# Patient Record
Sex: FEMALE | Race: WHITE | NOT HISPANIC OR LATINO | URBAN - METROPOLITAN AREA
[De-identification: names, ages, dates, MRNs, and addresses within clinical notes are randomized per-mention and may not be internally consistent; named-entity substitution may affect disease eponyms.]

---

## 2017-05-05 ENCOUNTER — LAB REQUISITION (OUTPATIENT)
Dept: LAB | Facility: HOSPITAL | Age: 82
End: 2017-05-05
Payer: MEDICARE

## 2017-05-05 DIAGNOSIS — R32 URINARY INCONTINENCE: ICD-10-CM

## 2017-05-05 DIAGNOSIS — N39.0 URINARY TRACT INFECTION: ICD-10-CM

## 2017-05-05 PROCEDURE — 87086 URINE CULTURE/COLONY COUNT: CPT | Performed by: FAMILY MEDICINE

## 2017-05-05 PROCEDURE — 87077 CULTURE AEROBIC IDENTIFY: CPT | Performed by: FAMILY MEDICINE

## 2017-05-05 PROCEDURE — 87186 SC STD MICRODIL/AGAR DIL: CPT | Performed by: FAMILY MEDICINE

## 2017-05-07 LAB — BACTERIA UR CULT: NORMAL

## 2017-05-08 ENCOUNTER — GENERIC CONVERSION - ENCOUNTER (OUTPATIENT)
Dept: OTHER | Facility: OTHER | Age: 82
End: 2017-05-08

## 2017-08-17 ENCOUNTER — GENERIC CONVERSION - ENCOUNTER (OUTPATIENT)
Dept: OTHER | Facility: OTHER | Age: 82
End: 2017-08-17

## 2017-08-17 ENCOUNTER — LAB REQUISITION (OUTPATIENT)
Dept: LAB | Facility: HOSPITAL | Age: 82
End: 2017-08-17
Payer: MEDICARE

## 2017-08-17 DIAGNOSIS — R39.9 UNSPECIFIED SYMPTOMS AND SIGNS INVOLVING THE GENITOURINARY SYSTEM: ICD-10-CM

## 2017-08-17 LAB
BILIRUB UR QL STRIP: NEGATIVE
CLARITY UR: NORMAL
COLOR UR: NORMAL
GLUCOSE (HISTORICAL): NORMAL
HGB UR QL STRIP.AUTO: 50
KETONES UR STRIP-MCNC: NEGATIVE MG/DL
LEUKOCYTE ESTERASE UR QL STRIP: 500
NITRITE UR QL STRIP: NORMAL
PH UR STRIP.AUTO: 7 [PH]
PROT UR STRIP-MCNC: NORMAL MG/DL
SP GR UR STRIP.AUTO: 1.01
UROBILINOGEN UR QL STRIP.AUTO: NORMAL

## 2017-08-17 PROCEDURE — 87077 CULTURE AEROBIC IDENTIFY: CPT | Performed by: FAMILY MEDICINE

## 2017-08-17 PROCEDURE — 87086 URINE CULTURE/COLONY COUNT: CPT | Performed by: FAMILY MEDICINE

## 2017-08-17 PROCEDURE — 87186 SC STD MICRODIL/AGAR DIL: CPT | Performed by: FAMILY MEDICINE

## 2017-08-19 LAB — BACTERIA UR CULT: NORMAL

## 2017-08-21 ENCOUNTER — GENERIC CONVERSION - ENCOUNTER (OUTPATIENT)
Dept: OTHER | Facility: OTHER | Age: 82
End: 2017-08-21

## 2017-10-27 ENCOUNTER — GENERIC CONVERSION - ENCOUNTER (OUTPATIENT)
Dept: OTHER | Facility: OTHER | Age: 82
End: 2017-10-27

## 2018-01-09 NOTE — RESULT NOTES
Verified Results  (1) URINE CULTURE [id number] 12:00PM Kiara Jacobson     Test Name Result Flag Reference   CLINICAL REPORT (Report)     Test:        Urine culture  Specimen Type:   Urine  Specimen Date:   8/17/2017 12:00 PM  Result Date:    8/19/2017 2:24 PM  Result Status:   Final result  Resulting Lab:   [facility] [address] [address]            Tel: [phone]      CULTURE                                       ------------------                                   >100,000 cfu/ml Raoultella ornithinolytica     *** This organism has been edited   The previous result was Gram Negative Dg      Enteric Like on 8/18/2017 at 1229 EDT  ***      SUSCEPTIBILITY                                   ------------------                                                       Raoultella ornithinolytica  METHOD                 MARY ANN  -------------------------------------  --------------------------  AMOXICILLIN + CLAVULANATE        <=8/4 ug/ml  Susceptible  AMPICILLIN ($$)             >16 00 ug/ml Resistant  AMPICILLIN + SULBACTAM ($)       <=8/4 ug/ml  Susceptible  AZTREONAM ($$$)             <=8 ug/ml   Susceptible  CEFAZOLIN ($)              <=8 00 ug/ml Susceptible  CIPROFLOXACIN ($)            <=1 00 ug/ml Susceptible  ERTAPENEM ($$$)             <=2 0 ug/ml  Susceptible  GENTAMICIN ($$)             <=4 ug/ml   Susceptible  IMIPENEM                <=4 ug/ml   Susceptible  LEVOFLOXACIN ($)            <=2 00 ug/ml Susceptible  MEROPENEM ($$)             <=4 00 ug/ml Susceptible  NITROFURANTOIN             64 ug/ml   Intermediate  PIPERACILLIN + TAZOBACTAM ($$$)     <=16 ug/ml  Susceptible  TETRACYCLINE              <=4 ug/ml   Susceptible  TOBRAMYCIN ($)             <=4 ug/ml   Susceptible  TRIMETHOPRIM + SULFAMETHOXAZOLE ($$$)  <=2/38 ug/ml Susceptible       Plan  Acute UTI    · Sulfamethoxazole-Trimethoprim 800-160 MG Oral Tablet; TAKE 1 TABLET  TWICE DAILY

## 2018-01-10 NOTE — RESULT NOTES
Verified Results  (1) URINE CULTURE [id number] 12:00PM Paras Olivera     Test Name Result Flag Reference   CLINICAL REPORT (Report)     Test:        Urine culture  Specimen Type:   Urine  Specimen Date:   5/5/2017 12:00 PM  Result Date:    5/7/2017 11:15 AM  Result Status:   Final result  Resulting Lab:   [facility] [address] [address]            Tel: [phone]      CULTURE                                       ------------------                                   >100,000 cfu/ml Escherichia coli      SUSCEPTIBILITY                                   ------------------                                                       Escherichia coli  METHOD                 MARY ANN  -------------------------------------  -------------------------  AMPICILLIN ($$)             <=8 00 ug/ml Susceptible  AZTREONAM ($$$)             <=8 ug/ml   Susceptible  CEFAZOLIN ($)              <=8 00 ug/ml Susceptible  CIPROFLOXACIN ($)            <=1 00 ug/ml Susceptible  GENTAMICIN ($$)             <=4 ug/ml   Susceptible  LEVOFLOXACIN ($)            <=2 00 ug/ml Susceptible  NITROFURANTOIN             <=32 ug/ml  Susceptible  PIPERACILLIN + TAZOBACTAM ($$$)     <=16 ug/ml  Susceptible  TETRACYCLINE              <=4 ug/ml   Susceptible  TOBRAMYCIN ($)             <=4 ug/ml   Susceptible  TRIMETHOPRIM + SULFAMETHOXAZOLE ($$$)  <=2/38 ug/ml Susceptible

## 2018-01-11 NOTE — PROGRESS NOTES
Assessment    1  Tinea corporis (110 5) (B35 4)   2  HTN (hypertension) (401 9) (I10)   3  Atherosclerosis of coronary artery of native heart without angina pectoris, unspecified   vessel or lesion type (414 01) (I25 10)   4  Recurrent UTI (urinary tract infection) (599 0) (N39 0)   5  Encounter for preventive health examination (V70 0) (Z00 00)    Plan  Recurrent UTI (urinary tract infection)    · (1) URINE CULTURE; Source:Urine, Clean Catch; Status:Active - Retrospective By  Protocol Authorization; Requested HPT:[id number];    · Urine Dip Automated- POC; Status:Resulted - Requires Verification;   Done: 95PMB9809  04:19PM  Tinea corporis    · Clotrimazole-Betamethasone 1-0 05 % External Cream; APPLY SPARINGLY TO  THE AFFECTED AREA(S) TWICE DAILY    Discussion/Summary    CONTINUE CURRENT TREATMENT PLAN  CREAM AS DIRECTED  URINE CULTURE  The treatment plan was reviewed with the patient/guardian  The patient/guardian understands and agrees with the treatment plan      Chief Complaint  Patient is here today for a dry patchy rash that is all over her body  Her right eye is also red with crusting in the mornings upon awakening  lb/lpn      History of Present Illness  HPI: AS NOTED ABOVE      Review of Systems    Constitutional: feeling tired, but no fever, not feeling poorly and no chills  ENT: no earache, no nosebleeds, no sore throat, no nasal discharge and no hoarseness  Cardiovascular: lower extremity edema, but no chest pain, no intermittent leg claudication, the heart rate was not fast and no palpitations  Respiratory: shortness of breath during exertion, but no shortness of breath, no cough, no orthopnea, no wheezing and no PND  Gastrointestinal: constipation, but no abdominal pain, no nausea, no vomiting, no diarrhea and no blood in stools  Musculoskeletal: arthralgias and joint stiffness, but no joint swelling and no myalgias  Integumentary: rash     Neurological: no headache, no numbness, no tingling and no dizziness  ROS reviewed  Active Problems    1  Acute bacterial conjunctivitis of both eyes (372 03) (H10 023)   2  Acute coronary syndrome (411 1) (I24 9)   3  Acute UTI (599 0) (N39 0)   4  Anemia (285 9) (D64 9)   5  Atherosclerosis of both carotid arteries (433 10,433 30) (I65 23)   6  Atherosclerosis of coronary artery of native heart without angina pectoris, unspecified   vessel or lesion type (414 01) (I25 10)   7  Carotid atherosclerosis (433 10) (I65 29)   8  Edema, unspecified edema (782 3) (R60 9)   9  Gastroesophageal reflux (530 81) (K21 9)   10  HTN (hypertension) (401 9) (I10)   11  Hyperlipidemia (272 4) (E78 5)   12  Mitral valve disorder (424 0) (I05 9)   13  Nocturia (788 43) (R35 1)   14  Occlusion and stenosis of carotid artery with cerebral infarction (433 11) (I63 9)   15  Osteoarthritis (715 90) (M19 90)   16  Syncope and collapse (780 2) (R55)   17  Tachycardia (785 0) (R00 0)   18  Thrush (112 0) (B37 0)   19  Urinary incontinence (788 30) (R32)    Past Medical History    1  History of Anemia due to poor nutrition (281 9) (D53 9)   2  History of Appetite loss (783 0) (R63 0)   3  History of Dehydration, mild (276 51) (E86 0)   4  History of acute cystitis (V13 02) (Z87 440)   5  History of fatigue (V13 89) (Z87 898)   6  History of urinary tract infection (V13 02) (Z87 440)   7  History of vertigo (V12 49) (Z87 898)   8  History of Polyuria (788 42) (R35 8)   9  History of Skin rash (782 1) (R21)   10  History of Tinea corporis (110 5) (B35 4)  Active Problems And Past Medical History Reviewed: The active problems and past medical history were reviewed and updated today  Family History    1  Family history of    2  Family history of congestive heart failure (V17 49) (Z82 49)    3  Family history of    4  Family history of myocardial infarction (V17 3) (Z82 49)    5  Family history of    6   Family history of lung cancer (V16 1) (Z80 1)  Family History Reviewed: The family history was reviewed and updated today  Social History    · Never a smoker   · No alcohol use   · No caffeine use  The social history was reviewed and updated today  Surgical History    1  History of Breast Surgery Lumpectomy   2  History of Knee Replacement  Surgical History Reviewed: The surgical history was reviewed and updated today  Current Meds   1  AmLODIPine Besylate 5 MG Oral Tablet; TAKE ONE (1) TABLET(S) DAILY  Requested   for: 93OMT7901; Last Rx:16Jun2015 Ordered   2  Cefuroxime Axetil 250 MG Oral Tablet; Take one twice daily for 10 days; Therapy: 21Jan2016 to (Evaluate:31Jan2016)  Requested for: 21Jan2016; Last   Rx:21Jan2016 Ordered   3  Clotrimazole-Betamethasone 1-0 05 % External Cream; APPLY THIN FILM TO   AFFECTED AREA(S) 3 TIMES DAILY; Therapy: (Recorded:27Jan2016) to Recorded   4  Diclofenac Sodium 75 MG Oral Tablet Delayed Release; TAKE ONE TABLET TWICE A   DAY WITH FOOD  Requested for: 49CNB9236; Last Rx:43Vqj8971 Ordered   5  Eliquis 2 5 MG Oral Tablet; Take 1 tablet twice daily; Therapy: 74JWG0065 to (Evaluate:11Rba2821) Recorded   6  Fluconazole 100 MG Oral Tablet; Take 1 tablet daily; Therapy: 13THT8003 to (James Ellis)  Requested for: [phone]; Last   Rx:21Pwt4047 Ordered   7  Hydrochlorothiazide 12 5 MG Oral Tablet; TAKE 1 TABLET Daily AS DIRECTED X 3   DAYS, MAY REPEAT WEEKLY FOR EDEMA; Therapy: 72BKI6419 to (97 149399); Last Rx:51Hox0240 Ordered   8  Misoprostol 200 MCG Oral Tablet; Take 1 tablet twice daily  Requested for: 91OTJ2395; Last Rx:62Wmg5162 Ordered   9  Omeprazole 40 MG Oral Capsule Delayed Release; TAKE 1 CAPSULE TWICE DAILY    Requested for: 41Wfu1804; Last Rx:86Nee5779 Ordered   10  Oxybutynin Chloride ER 5 MG Oral Tablet Extended Release 24 Hour; Take 1 tablet    twice daily  Requested for: 90IHW8520; Last Rx:51Atz0054 Ordered   11   Sulfacetamide Sodium 10 % Ophthalmic Solution; INSTILL 2 DROP 4 times daily x 5    days; Therapy: 42DGO6804 to (Last Rx:17Jan2016)  Requested for: 28FMI2857 Ordered   12  VESIcare 10 MG Oral Tablet; TAKE 1 TABLET Bedtime; Therapy: 67PKD7023 to (); Last Rx:83Dnn7052 Ordered    The medication list was reviewed and updated today  Allergies    1  acetaminophen   2  codeine   3  Phenergan   4  prochlorperazine   5  Voltaren    Vitals   Recorded: 24UZZ8045 02:56PM   Temperature 98 3 F, Tympanic    Heart Rate 88, L Radial    Pulse Quality Normal, L Radial    Respiration 16    Respiration Quality Normal    Systolic 206, LUE, Sitting    Diastolic 92, LUE, Sitting    Height 4 ft 10 5 in    Weight 105 lb     BMI Calculated 21 57    BSA Calculated 1 39    Patient Refused Height No No   Patient Refused Weight No No     Physical Exam    Constitutional   General appearance: Abnormal   PALE  Eyes   Conjunctiva and lids: Abnormal   PALE  Pupils and irises: Abnormal     Ears, Nose, Mouth, and Throat   External inspection of ears and nose: Normal     Nasal mucosa, septum, and turbinates: Normal without edema or erythema  Oropharynx: Abnormal   SL DRY, MILD THRUSH NOTED  Pulmonary   Respiratory effort: No increased work of breathing or signs of respiratory distress  Auscultation of lungs: Clear to auscultation  Cardiovascular   Auscultation of heart: Abnormal   S1S2 NL RRR LYN  Examination of extremities for edema and/or varicosities: Abnormal   1+ EDEMA BILAT  Carotid pulses: Abnormal   FAINT BRUITS BILAT  Abdomen   Abdomen: Non-tender, no masses  Liver and spleen: No hepatomegaly or splenomegaly  Lymphatic   Palpation of lymph nodes in neck: No lymphadenopathy  Musculoskeletal   Gait and station: Normal     Digits and nails: Normal without clubbing or cyanosis  Inspection/palpation of joints, bones, and muscles: Abnormal   MODERATE DJD FINDINGS     Skin   Skin and subcutaneous tissue: Normal without rashes or lesions  Examination of the skin for lesions: Abnormal   DRY PATCHES ON NECK, SHOULDERS, CHEST AND BACK  Neurologic   Sensation: No sensory loss      Psychiatric   Orientation to person, place, and time: Normal     Mood and affect: Normal          Results/Data  Urine Dip Automated- POC 68SMZ5481 04:19PM Silver Quiver     Test Name Result Flag Reference   Color Yellow     Clarity Transparent     Leukocytes Negative     Nitrite Negative     Blood Negative     Bilirubin Negative     Urobilinogen Normal     Protein 30     Ph 6 0     Specific Gravity 1 020     Ketone Negative     Glucose Normal         Signatures   Electronically signed by : Juve Zurita MD; Jan 27 2016  8:39PM EST                       (Author)

## 2018-01-11 NOTE — RESULT NOTES
Message   PLEASE CALL   URINE CULTURE WAS POSITIVE   HOW IS SHE FEELING   THANKS     Verified Results  (1) URINE CULTURE 28Jun2016 03:00PM Aura Chowdhury     Test Name Result Flag Reference   CLINICAL REPORT (Report)     Test:        Urine culture  Specimen Type:   Urine  Specimen Date:   6/28/2016 3:00 PM  Result Date:    6/30/2016 10:57 AM  Result Status:   Final result  Resulting Lab:   [address] [address]            Tel: [phone]      CULTURE                                       ------------------                                   >100,000 cfu/ml Klebsiella pneumoniae      SUSCEPTIBILITY                                   ------------------                                                       Klebsiella pneumoniae  METHOD                 MARY ANN  -------------------------------------  -------------------------  AMPICILLIN ($$)             >16 00 ug/ml Resistant  AMPICILLIN + SULBACTAM ($)       <=8/4 ug/ml  Susceptible  AZTREONAM ($$$)             <=8 ug/ml   Susceptible  CEFAZOLIN ($)              <=8 00 ug/ml Susceptible  CIPROFLOXACIN ($)            <=1 00 ug/ml Susceptible  GENTAMICIN ($$)             <=4 ug/ml   Susceptible  LEVOFLOXACIN ($)            <=2 00 ug/ml Susceptible  NITROFURANTOIN             >64 ug/ml   Resistant  PIPERACILLIN + TAZOBACTAM ($$$)     <=16 ug/ml  Susceptible  TETRACYCLINE              <=4 ug/ml   Susceptible  TOBRAMYCIN ($)             <=4 ug/ml   Susceptible  TRIMETHOPRIM + SULFAMETHOXAZOLE ($$$)  <=2/38 ug/ml Susceptible

## 2018-01-14 NOTE — PROGRESS NOTES
Chief Complaint  Patient dropped off a urine sample as per Veronica's request  lb/lpn      Active Problems    1  Acute bacterial conjunctivitis of both eyes (372 03) (H10 023)   2  Acute coronary syndrome (411 1) (I24 9)   3  Acute UTI (599 0) (N39 0)   4  Anemia (285 9) (D64 9)   5  Atherosclerosis of both carotid arteries (433 10,433 30) (I65 23)   6  Atherosclerosis of coronary artery of native heart without angina pectoris, unspecified   vessel or lesion type (414 01) (I25 10)   7  Carotid atherosclerosis (433 10) (I65 29)   8  Edema, unspecified edema   9  Gastroesophageal reflux (530 81) (K21 9)   10  HTN (hypertension) (401 9) (I10)   11  Hyperlipidemia (272 4) (E78 5)   12  Mitral valve disorder (424 0) (I05 9)   13  Nocturia (788 43) (R35 1)   14  Occlusion and stenosis of carotid artery with cerebral infarction (433 11) (I63 239)   15  Osteoarthritis (715 90) (M19 90)   16  Recurrent UTI (urinary tract infection) (599 0) (N39 0)   17  Syncope and collapse (780 2) (R55)   18  Tachycardia (785 0) (R00 0)   19  Thrush (112 0) (B37 0)   20  Tinea corporis (110 5) (B35 4)   21  Urinary incontinence (788 30) (R32)    Current Meds   1  AmLODIPine Besylate 5 MG Oral Tablet; TAKE ONE (1) TABLET(S) DAILY  Requested   for: 77BKX8538; Last Rx:16Jun2015 Ordered   2  Cefuroxime Axetil 250 MG Oral Tablet; Take one twice daily for 10 days; Therapy: 21Jan2016 to (Evaluate:31Jan2016)  Requested for: 21Jan2016; Last   Rx:21Jan2016 Ordered   3  Ciprofloxacin HCl - 250 MG Oral Tablet; take 1 tablet every twelve hours; Therapy: 25TGW0431 to (Evaluate:16Apr2016)  Requested for: 09Apr2016; Last   Rx:09Apr2016 Ordered   4  Clotrimazole-Betamethasone 1-0 05 % External Cream; APPLY SPARINGLY TO THE   AFFECTED AREA(S) TWICE DAILY; Therapy: 30KCZ7557 to (Evaluate:20Wrz5404)  Requested for: 92LTK8220; Last   Rx:27Jan2016 Ordered   5   Clotrimazole-Betamethasone 1-0 05 % External Cream; APPLY THIN FILM TO   AFFECTED AREA(S) 3 TIMES DAILY; Therapy: (NMICINXU:86VXS1375) to Recorded   6  Diclofenac Sodium 75 MG Oral Tablet Delayed Release; TAKE ONE TABLET TWICE A   DAY WITH FOOD  Requested for: 31QKI5737; Last Rx:23Oct2015 Ordered   7  Eliquis 2 5 MG Oral Tablet; Take 1 tablet twice daily; Therapy: 50YFO0986 to (Evaluate:72Wny3542) Recorded   8  Fluconazole 100 MG Oral Tablet; Take 1 tablet daily; Therapy: 09UHC6200 to (Shashank Saenz)  Requested for: [phone]; Last   Rx:23Bpu4400 Ordered   9  Hydrochlorothiazide 12 5 MG Oral Tablet; TAKE 1 TABLET Daily AS DIRECTED X 3   DAYS, MAY REPEAT WEEKLY FOR EDEMA; Therapy: 25XRS7592 to (03 680311); Last Rx:61Mdo5404 Ordered   10  Misoprostol 200 MCG Oral Tablet; Take 1 tablet twice daily  Requested for: 90VJF6743; Last Rx:13Uif8835 Ordered   11  Omeprazole 40 MG Oral Capsule Delayed Release; TAKE 1 CAPSULE TWICE DAILY     Requested for: 36Vsl6683; Last Rx:04Ubt3730 Ordered   12  Oxybutynin Chloride 5 MG Oral Tablet; Take 1 tablet twice daily; Therapy: 50FAG4591 to (Evaluate:05Mar2016); Last Rx:82Esw6690 Ordered   13  Oxybutynin Chloride ER 5 MG Oral Tablet Extended Release 24 Hour; Take 1 tablet    twice daily  Requested for: 71OZK9114; Last Rx:16Mar2016 Ordered   14  Sulfacetamide Sodium 10 % Ophthalmic Solution; INSTILL 2 DROP 4 times daily x 5    days; Therapy: 13VZW2511 to (Last Rx:17Jan2016)  Requested for: 65ERA0948 Ordered   15  VESIcare 10 MG Oral Tablet; TAKE 1 TABLET Bedtime; Therapy: 09EVA5718 to (25 448398); Last Rx:89Ptl2343 Ordered    Allergies    1  acetaminophen   2  codeine   3  Phenergan   4  prochlorperazine   5   Voltaren    Results/Data  Urine Dip Automated- POC 27Nhb0983 02:12PM Jose E Lyles     Test Name Result Flag Reference   Color Yellow     Clarity Transparent     Leukocytes Negative     Nitrite Negative     Blood Negative     Bilirubin Negative     Urobilinogen Normal     Protein Negative     Ph 6 0     Specific Gravity 1 020 Ketone Negative     Glucose Normal     Color Yellow     Clarity Transparent     Leukocytes Negative     Nitrite Negative     Blood Negative     Bilirubin Negative     Urobilinogen Normal     Protein Negative     Ph 6 0     Specific Gravity 1 020     Ketone Negative     Glucose Normal               Plan  Urinary incontinence    · Urine Dip Automated- POC; Status:Resulted - Requires Verification,Retrospective By  Protocol Authorization;   Done: 83YXM0028 02:12PM    Signatures   Electronically signed by : Flash Alba MD; Apr 11 2016  3:14PM EST                       (Author)

## 2018-01-14 NOTE — MISCELLANEOUS
Message  Received call from patient's daughter, Demetrius Rosario  She reports her mother have all typical symptoms of uti: incontinence, lethargy, foul smelling urine  she requested that i call in an antibiotic  I advised that urine should be tested to ensure that there is indeed an infection  I counseled on the overuse/inappropriate use of antibiotics  She insisted that i order an antibiotic  She reports that her mother has tolerated cipro in past  I advised her to take her mother to the ER for eval if her condition declines  she should follow up with Dr Brooklyn Miranda  Plan  Recurrent UTI (urinary tract infection)    · Ciprofloxacin HCl - 250 MG Oral Tablet; take 1 tablet every twelve hours    Signatures   Electronically signed by : SHERRILL Valadez;  Apr 9 2016  6:21PM EST                       (Author)

## 2018-09-28 ENCOUNTER — TELEPHONE (OUTPATIENT)
Dept: FAMILY MEDICINE CLINIC | Facility: CLINIC | Age: 83
End: 2018-09-28

## 2018-09-28 NOTE — TELEPHONE ENCOUNTER
Scheduled for 10/3 at 4:15 - Address [address] [place] [address]  Across the [address] [facility]

## 2018-10-03 ENCOUNTER — OFFICE VISIT (OUTPATIENT)
Dept: FAMILY MEDICINE CLINIC | Facility: CLINIC | Age: 83
End: 2018-10-03
Payer: MEDICARE

## 2018-10-03 DIAGNOSIS — L89.152 PRESSURE INJURY OF SACRAL REGION, STAGE 2 (HCC): ICD-10-CM

## 2018-10-03 DIAGNOSIS — M15.9 PRIMARY OSTEOARTHRITIS INVOLVING MULTIPLE JOINTS: ICD-10-CM

## 2018-10-03 DIAGNOSIS — B37.0 THRUSH: Primary | ICD-10-CM

## 2018-10-03 DIAGNOSIS — Z99.3 WHEELCHAIR BOUND: ICD-10-CM

## 2018-10-03 PROCEDURE — 99214 OFFICE O/P EST MOD 30 MIN: CPT | Performed by: FAMILY MEDICINE

## 2018-10-05 PROBLEM — Z99.3 WHEELCHAIR BOUND: Status: ACTIVE | Noted: 2018-10-05

## 2018-10-05 PROBLEM — L89.152 PRESSURE INJURY OF SACRAL REGION, STAGE 2 (HCC): Status: ACTIVE | Noted: 2018-10-05

## 2018-10-05 PROBLEM — B37.0 THRUSH: Status: ACTIVE | Noted: 2018-10-05

## 2018-10-05 PROBLEM — M15.9 PRIMARY OSTEOARTHRITIS INVOLVING MULTIPLE JOINTS: Status: ACTIVE | Noted: 2018-10-05

## 2018-10-07 RX ORDER — AMLODIPINE BESYLATE 5 MG/1
1 TABLET ORAL DAILY
COMMUNITY

## 2018-10-07 RX ORDER — OXYBUTYNIN CHLORIDE 5 MG/1
TABLET ORAL 2 TIMES DAILY
COMMUNITY
Start: 2016-02-04

## 2018-10-07 RX ORDER — PANTOPRAZOLE SODIUM 40 MG/1
1 TABLET, DELAYED RELEASE ORAL DAILY
COMMUNITY
Start: 2016-04-12 | End: 2018-10-25 | Stop reason: ALTCHOICE

## 2018-10-07 RX ORDER — ASPIRIN 81 MG/1
TABLET ORAL
COMMUNITY
End: 2018-10-25 | Stop reason: ALTCHOICE

## 2018-10-07 RX ORDER — METOPROLOL SUCCINATE 100 MG/1
TABLET, EXTENDED RELEASE ORAL
COMMUNITY
End: 2018-10-25 | Stop reason: ALTCHOICE

## 2018-10-07 RX ORDER — MISOPROSTOL 200 UG/1
1 TABLET ORAL 2 TIMES DAILY
COMMUNITY
End: 2018-10-25 | Stop reason: ALTCHOICE

## 2018-10-08 NOTE — PATIENT INSTRUCTIONS
CONTINUE CURRENT CARE  HOME HEALTH EVAL FOR WOUND CARE  POSSIBLE PT  NYSTATIN FOR THRUSH  RV 3 WEEKS

## 2018-10-08 NOTE — PROGRESS NOTES
Assessment/Plan:    Problem List Items Addressed This Visit        Digestive    Thrush - Primary    Relevant Medications    nystatin (MYCOSTATIN) 100,000 units/mL suspension       Musculoskeletal and Integument    Pressure injury of sacral region, stage 2    Relevant Orders    Ambulatory Referral to [address] [place] [address] Sage Hung    Primary osteoarthritis involving multiple joints    Relevant Orders    Ambulatory Referral to [address] [place] [address] Sage Hung       Other    Wheelchair bound    Relevant Orders    Ambulatory Referral to [address] [place] [address] Sage Hung          Patient Instructions   [address] EVAL FOR WOUND CARE  POSSIBLE PT  NYSTATIN FOR THRUSH  RV 3 WEEKS      Return in about 3 weeks (around 10/24/2018)  Subjective:      Patient ID: Dat Medrano is a 80 y o  female  No chief complaint on file  HOUSE CALL  CALLED TO HOUSE TO ACCESS PATIENT FOR HOME HEALTH AND HOME PT    PATIENT IS BED BOUND  HAS SEVERAL PRESSURE ULCERS    ST  APPETITE POOR  POOR PO INTAKE  NO FEVER OR CHILLS        The following portions of the patient's history were reviewed and updated as appropriate: allergies, current medications, past family history, past medical history, past social history, past surgical history and problem list     Review of Systems   Constitutional: Positive for activity change and appetite change  Negative for chills, fatigue and fever  HENT: Positive for sore throat  Negative for congestion, ear discharge, ear pain, mouth sores, postnasal drip and trouble swallowing  Eyes: Negative for pain, discharge and visual disturbance  Respiratory: Negative for cough, shortness of breath and wheezing  Cardiovascular: Negative for chest pain, palpitations and leg swelling  Gastrointestinal: Negative for abdominal distention, abdominal pain, blood in stool, diarrhea and nausea  Endocrine: Negative for polydipsia, polyphagia and polyuria  Genitourinary: Negative for dysuria, frequency, hematuria and urgency  Musculoskeletal: Positive for arthralgias  Negative for gait problem and joint swelling  Skin: Positive for wound  Negative for pallor and rash  Neurological: Negative for dizziness, syncope, speech difficulty, weakness, light-headedness, numbness and headaches  Hematological: Negative for adenopathy  Psychiatric/Behavioral: Negative for behavioral problems, confusion and sleep disturbance  The patient is not nervous/anxious  Current Outpatient Prescriptions   Medication Sig Dispense Refill    apixaban (ELIQUIS) 2 5 mg Take 1 tablet by mouth 2 (two) times a day      oxybutynin (DITROPAN) 5 mg tablet Take by mouth      pantoprazole (PROTONIX) 40 mg tablet Take 1 tablet by mouth daily      amLODIPine (NORVASC) 5 mg tablet Take 1 tablet by mouth daily      aspirin (ADULT ASPIRIN EC LOW STRENGTH) 81 mg EC tablet Take by mouth      metoprolol succinate (TOPROL XL) 100 mg 24 hr tablet Take by mouth      misoprostol (CYTOTEC) 200 mcg tablet Take 1 tablet by mouth 2 (two) times a day      nystatin (MYCOSTATIN) 100,000 units/mL suspension Apply 5 mL (500,000 Units total) to the mouth or throat 4 (four) times a day 60 mL 3     No current facility-administered medications for this visit  Objective: There were no vitals taken for this visit  Physical Exam   Constitutional: She is oriented to person, place, and time  THIN, PALE   HENT:   Head: Normocephalic and atraumatic  PHARYNX DRY, RED  APPEARS TO BE CONSISTENT WITH THRUSH   Eyes: Pupils are equal, round, and reactive to light  Conjunctivae and EOM are normal  Right eye exhibits no discharge  Left eye exhibits no discharge  Neck: Normal range of motion  Neck supple  No thyromegaly present  Cardiovascular: Normal rate, regular rhythm and normal heart sounds  No murmur heard  Pulmonary/Chest: Effort normal and breath sounds normal  No respiratory distress  She has no wheezes  She has no rales  Abdominal: Soft   Bowel sounds are normal  There is no tenderness  THIN   Musculoskeletal: Normal range of motion  She exhibits no edema or tenderness  Lymphadenopathy:     She has no cervical adenopathy  Neurological: She is alert and oriented to person, place, and time  Skin: Skin is warm and dry  No rash noted  No erythema  STAGE 2 SACRAL DECUB X 2   Psychiatric: She has a normal mood and affect   Her behavior is normal  Judgment and thought content normal               Yomaira Dale MD

## 2018-10-16 ENCOUNTER — TELEPHONE (OUTPATIENT)
Dept: FAMILY MEDICINE CLINIC | Facility: CLINIC | Age: 83
End: 2018-10-16

## 2018-10-16 NOTE — TELEPHONE ENCOUNTER
Lawrence Lee asked when you plan on coming for another home visit    Also they want to move forward with getting her set up with [facility] health PT asap  [phone], because Lawrence Lee states he cant walk

## 2018-10-19 NOTE — TELEPHONE ENCOUNTER
Pts Daughter Alireza Desai called back today and would Like the prescription for physical [facility] sent to [facility], also would like to know when Dr Mikaela Strauss will be doing the follow up apt on the pt  Pts daughter is very upset because mother can barely walk and she does not want her to be confide to bed   Alireza Desai Can be reached at [phone] with any updated information on the prescription for physical therapy and when the follow up apt is for her mother

## 2018-10-19 NOTE — TELEPHONE ENCOUNTER
PLEASE CALL BACK    WILL SEND UP RX FOR PT - THOUGHT I DID ALREADY    WE CAN SET UP A HOUSE CALL IN THE NEXT 2 WEEKS  MAKE SURE SHE KNOWS THAT HOUSE CALLS CANNOT BE PERFORMED ON AN ACUTE BASIS AND HAVE TO BE SCHEDULED IN ADVANCE    THANKS

## 2018-10-25 ENCOUNTER — TELEPHONE (OUTPATIENT)
Dept: FAMILY MEDICINE CLINIC | Facility: CLINIC | Age: 83
End: 2018-10-25

## 2018-10-25 RX ORDER — LEVOTHYROXINE SODIUM 0.03 MG/1
TABLET ORAL
Refills: 2 | COMMUNITY
Start: 2018-08-22

## 2018-10-25 RX ORDER — SULFAMETHOXAZOLE AND TRIMETHOPRIM 800; 160 MG/1; MG/1
TABLET ORAL
Refills: 0 | COMMUNITY
Start: 2018-09-22 | End: 2018-11-19

## 2018-10-25 RX ORDER — OMEPRAZOLE 20 MG/1
CAPSULE, DELAYED RELEASE ORAL
Refills: 2 | COMMUNITY
Start: 2018-08-22

## 2018-10-25 NOTE — TELEPHONE ENCOUNTER
Received the script for Tom Lassiter    Script Needs to say    Semi-electric hospital bed with the length of need of 99 months    Note also needs to  say why she needs the bed    Faxed - [phone]

## 2018-10-25 NOTE — TELEPHONE ENCOUNTER
Received the script from Dr Jeanna Neil  Called the daughter, Goran Bhardwaj to find out where to send it  She was not sure  She will find out and call us back

## 2018-10-25 NOTE — TELEPHONE ENCOUNTER
Reviewed and updated medications,  Also gave permission to Patrick Hooker to order mepilex  for stage 2 ulceration of  sacral area Per Eric Castillo MD       Patient is asking for a Rx for a hospital bed and cushions for repositioning  Patient is asking for a sterile urine container to be dropped off at her home    (REMINDER)

## 2018-11-07 ENCOUNTER — TELEPHONE (OUTPATIENT)
Dept: FAMILY MEDICINE CLINIC | Facility: CLINIC | Age: 83
End: 2018-11-07

## 2018-11-07 NOTE — TELEPHONE ENCOUNTER
Nika Adams (visiting nurse) called to inform you that she gave Verdia Mola  a suppository because she has not had a bowel movement in 1 week  It did work, Verdia Mola had a bowel movement today  Also her  appetite has decreased  Her blood pressure is 110/60 which is slightly lower than her normal   Her daughter stated that she is feeling better after having the bowel movement    Thank You

## 2018-11-12 ENCOUNTER — TELEPHONE (OUTPATIENT)
Dept: FAMILY MEDICINE CLINIC | Facility: CLINIC | Age: 83
End: 2018-11-12

## 2018-11-12 NOTE — TELEPHONE ENCOUNTER
Needs a refill  for oral thrush swish and swallow med that you gave her a few months ago    Please call into  [facility]  (Also reminder you have a house call with them Wed 11/14/18 after 4pm)

## 2018-11-13 DIAGNOSIS — B37.0 THRUSH: ICD-10-CM

## 2018-11-14 ENCOUNTER — TELEPHONE (OUTPATIENT)
Dept: OTHER | Facility: OTHER | Age: 83
End: 2018-11-14

## 2018-11-14 ENCOUNTER — OFFICE VISIT (OUTPATIENT)
Dept: FAMILY MEDICINE CLINIC | Facility: CLINIC | Age: 83
End: 2018-11-14
Payer: MEDICARE

## 2018-11-14 DIAGNOSIS — B37.0 THRUSH: ICD-10-CM

## 2018-11-14 DIAGNOSIS — Z74.01 BED CONFINEMENT STATUS: ICD-10-CM

## 2018-11-14 DIAGNOSIS — L89.152 PRESSURE INJURY OF SACRAL REGION, STAGE 2 (HCC): ICD-10-CM

## 2018-11-14 DIAGNOSIS — N39.0 RECURRENT UTI (URINARY TRACT INFECTION): ICD-10-CM

## 2018-11-14 DIAGNOSIS — M15.9 PRIMARY OSTEOARTHRITIS INVOLVING MULTIPLE JOINTS: Primary | ICD-10-CM

## 2018-11-14 PROCEDURE — 99348 HOME/RES VST EST LOW MDM 30: CPT | Performed by: FAMILY MEDICINE

## 2018-11-14 RX ORDER — OXYCODONE HCL 5 MG/5 ML
2.5 SOLUTION, ORAL ORAL EVERY 4 HOURS PRN
Qty: 15 ML | Refills: 0 | Status: SHIPPED | OUTPATIENT
Start: 2018-11-14

## 2018-11-14 RX ORDER — FLUCONAZOLE 10 MG/ML
100 POWDER, FOR SUSPENSION ORAL DAILY
Qty: 140 ML | Refills: 1 | Status: SHIPPED | OUTPATIENT
Start: 2018-11-14 | End: 2018-11-28

## 2018-11-14 NOTE — PATIENT INSTRUCTIONS
CONTINUE CURRENT CARE  URINE CULTURE WILL BE OBTAINED  THRUSH TREATMENT  PAIN MANAGEMENT  WILL RV IN 2-3 WEEKS  CALL SOONER PRN

## 2018-11-14 NOTE — ASSESSMENT & PLAN NOTE
HEALING SLOWLY AS PER AID  STILL A LOT OF PAIN  NO DRAINAGE    - CONTINUE LOCAL CARE  - PAIN MANAGEMENT

## 2018-11-14 NOTE — PROGRESS NOTES
Assessment/Plan:    Problem List Items Addressed This Visit        Digestive    Thrush     THRUSH WORSE  ST  TRYING TO USE NYSTATIN  WILL NOT USE MYCELEX         Relevant Medications    fluconazole (DIFLUCAN) 10 MG/ML suspension       Musculoskeletal and Integument    Pressure injury of sacral region, stage 2     HEALING SLOWLY AS PER AID  STILL A LOT OF PAIN  NO DRAINAGE    - CONTINUE LOCAL CARE  - PAIN MANAGEMENT         Relevant Medications    oxyCODONE (ROXICODONE) 5 mg/5 mL solution    Primary osteoarthritis involving multiple joints - Primary     UNCOMFORTABLE  NO JOINT SWELLING OR REDNESS         Relevant Medications    oxyCODONE (ROXICODONE) 5 mg/5 mL solution       Genitourinary    Recurrent UTI (urinary tract infection)     INCONTINENT  URINE CULTURE WILL BE OBTAINED            Other    Bed confinement status     HOSPITAL BED DELIVERED               Patient Instructions   CONTINUE CURRENT CARE  URINE CULTURE WILL BE OBTAINED  THRUSH TREATMENT  PAIN MANAGEMENT  WILL RV IN 2-3 WEEKS  CALL SOONER PRN      Return in about 3 weeks (around 12/5/2018) for Recheck  Subjective:      Patient ID: Demetrice Owens is a 80 y o  female  No chief complaint on file  HOME VISIT    PATIENT IS CONFINED TO A HOSPITAL BED AT HOME  COMPLAINS OF PAIN IN HER LOWER BACK AND BUTTOCK REGION  AID STATES THAT BED SORE IS ALMOST CLOSED      APPETITE HAS BEEN POOR  BM NORMAL  CONCERNED ABOUT PATIENT'S THRUSH    HOME PT HAS BEEN DISCONTINUED  VISITING RN IN PLACE        The following portions of the patient's history were reviewed and updated as appropriate: allergies, current medications, past family history, past medical history, past social history, past surgical history and problem list     Review of Systems   Constitutional: Positive for appetite change and fatigue  Negative for chills and fever  HENT: Positive for sore throat and trouble swallowing   Negative for congestion, ear discharge, ear pain, mouth sores and postnasal drip  Eyes: Negative for pain, discharge and visual disturbance  Respiratory: Negative for cough, shortness of breath and wheezing  Cardiovascular: Negative for chest pain, palpitations and leg swelling  Gastrointestinal: Negative for abdominal distention, abdominal pain, blood in stool, diarrhea and nausea  Endocrine: Negative for polydipsia, polyphagia and polyuria  Genitourinary: Negative for dysuria, frequency, hematuria and urgency  Musculoskeletal: Positive for arthralgias and gait problem  Negative for joint swelling  Skin: Positive for wound  Negative for pallor and rash  Neurological: Negative for dizziness, syncope, speech difficulty, weakness, light-headedness, numbness and headaches  Hematological: Negative for adenopathy  Psychiatric/Behavioral: Negative for behavioral problems, confusion and sleep disturbance  The patient is not nervous/anxious  Current Outpatient Prescriptions   Medication Sig Dispense Refill    amLODIPine (NORVASC) 5 mg tablet Take 1 tablet by mouth daily      apixaban (ELIQUIS) 2 5 mg Take 1 tablet by mouth 2 (two) times a day      fluconazole (DIFLUCAN) 10 MG/ML suspension Take 10 mL (100 mg total) by mouth daily for 14 days 140 mL 1    levothyroxine 25 mcg tablet   2    nystatin (MYCOSTATIN) 100,000 units/mL suspension Apply 5 mL (500,000 Units total) to the mouth or throat 4 (four) times a day 60 mL 0    omeprazole (PriLOSEC) 20 mg delayed release capsule   2    oxybutynin (DITROPAN) 5 mg tablet Take by mouth      oxyCODONE (ROXICODONE) 5 mg/5 mL solution Take 2 5 mL (2 5 mg total) by mouth every 4 (four) hours as needed for moderate pain Max Daily Amount: 15 mg 15 mL 0    RESTASIS MULTIDOSE 0 05 % ophthalmic emulsion   1    sulfamethoxazole-trimethoprim (BACTRIM DS) 800-160 mg per tablet   0     No current facility-administered medications for this visit  Objective: There were no vitals taken for this visit  Physical Exam   Constitutional: She is oriented to person, place, and time  THIN  IN BED   HENT:   Head: Normocephalic and atraumatic  Eyes: Pupils are equal, round, and reactive to light  EOM are normal  Right eye exhibits no discharge  Left eye exhibits no discharge  CONJ PALE   Neck: Normal range of motion  Neck supple  No thyromegaly present  Cardiovascular: Normal rate, regular rhythm and normal heart sounds  No murmur heard  Pulmonary/Chest: Effort normal and breath sounds normal  No respiratory distress  She has no wheezes  She has no rales  BIBASILAR CRACKLES   Abdominal: Soft  Bowel sounds are normal  There is tenderness  MILD LLQ DISCOMFORT  NO GUARDING     Musculoskeletal: She exhibits no edema or tenderness  BED BOUND  MOD DJD CHANGES   Lymphadenopathy:     She has no cervical adenopathy  Neurological: She is alert and oriented to person, place, and time  Skin: Skin is warm and dry  No rash noted  No erythema  Psychiatric: She has a normal mood and affect   Her behavior is normal               Aretha Ramos MD

## 2018-11-15 ENCOUNTER — TELEPHONE (OUTPATIENT)
Dept: FAMILY MEDICINE CLINIC | Facility: CLINIC | Age: 83
End: 2018-11-15

## 2018-11-15 NOTE — TELEPHONE ENCOUNTER
It was so long ago, her daughter does not remember or knows  She is sure that if her mom states that she's allergic, that means she had a bad time with it

## 2018-11-15 NOTE — TELEPHONE ENCOUNTER
Zakia can we call michelle's daughter and find out what type of rxn    I want to call in some medication that is not codeine, but is related    Should be ok, but it would be great to know what type of rxn it was    Thanks

## 2018-11-15 NOTE — TELEPHONE ENCOUNTER
Daughter stated to pharmacy that her mother is allergic to Codeine  Pharmacist is not sure what type of reaction she has with Codeine

## 2018-11-16 ENCOUNTER — TELEPHONE (OUTPATIENT)
Dept: FAMILY MEDICINE CLINIC | Facility: CLINIC | Age: 83
End: 2018-11-16

## 2018-11-16 NOTE — TELEPHONE ENCOUNTER
Philip Galindo took the mouth wash that you prescribed and has a bad reaction to it  Could not stop coughing for about 15 minutes  She's ok now  Will not continue to give it to her  The physical therapist was there and took her heart rate  It was 124        And    Also needs script for PT    Please mail script or call if it is ready on Saturday or MOnday

## 2018-11-19 ENCOUNTER — TELEPHONE (OUTPATIENT)
Dept: FAMILY MEDICINE CLINIC | Facility: CLINIC | Age: 83
End: 2018-11-19

## 2018-11-19 DIAGNOSIS — R82.998 LEUKOCYTES IN URINE: Primary | ICD-10-CM

## 2018-11-19 DIAGNOSIS — N39.0 RECURRENT UTI (URINARY TRACT INFECTION): Primary | ICD-10-CM

## 2018-11-19 LAB
SL AMB  POCT GLUCOSE, UA: NORMAL
SL AMB LEUKOCYTE ESTERASE,UA: 75
SL AMB POCT BILIRUBIN,UA: NEGATIVE
SL AMB POCT BLOOD,UA: 50
SL AMB POCT CLARITY,UA: ABNORMAL
SL AMB POCT COLOR,UA: YELLOW
SL AMB POCT KETONES,UA: NEGATIVE
SL AMB POCT NITRITE,UA: POSITIVE
SL AMB POCT PH,UA: 9
SL AMB POCT SPECIFIC GRAVITY,UA: 1.01
SL AMB POCT URINE PROTEIN: NEGATIVE
SL AMB POCT UROBILINOGEN: NORMAL

## 2018-11-19 PROCEDURE — 81003 URINALYSIS AUTO W/O SCOPE: CPT | Performed by: FAMILY MEDICINE

## 2018-11-19 RX ORDER — SULFAMETHOXAZOLE AND TRIMETHOPRIM 200; 40 MG/5ML; MG/5ML
10 SUSPENSION ORAL 2 TIMES DAILY
Qty: 100 ML | Refills: 0 | Status: SHIPPED | OUTPATIENT
Start: 2018-11-19 | End: 2018-11-29

## 2018-11-19 NOTE — TELEPHONE ENCOUNTER
Dr Eric Mendez, did you see Moni's message before dewayne Gordillo's daughter is asking for a rx to be called in for the urine specimen she dropped off today      [employer]

## 2018-11-19 NOTE — TELEPHONE ENCOUNTER
Sonal-   Rahel Cummins form [facility] called stating they  signed an order for DNR for their home  Also increased her senna to 2x day because she isnt moving her bowels  Rahel Maria G feels Austyn Chau isnt doing well, but the family declined hospice

## 2018-11-19 NOTE — TELEPHONE ENCOUNTER
Patient had Urine specimen dropped off  Ran a UA dip an sent you the results  Daughter would like results      Laura Hicks

## 2018-11-19 NOTE — TELEPHONE ENCOUNTER
I spoke to [place] daughter, Sophy Jo  She advised that Jamila Mosqueda is unable to provide a better urine specimen  She sleeps all day and wears a Depend - garment  She is asking for something to be called into the pharmacy  Please advise    Rahel Banegas

## 2018-11-29 ENCOUNTER — TELEPHONE (OUTPATIENT)
Dept: FAMILY MEDICINE CLINIC | Facility: CLINIC | Age: 83
End: 2018-11-29

## 2018-11-29 NOTE — TELEPHONE ENCOUNTER
fyi- She has signs and symptoms of hallucinations per her daughter and will be testing her urine to see if free from infection however her daughter is concerned they may be side effects of bactrim and would like her chart flagged as having an allergy to bactrim  (they will be sending an nurse to evaluate her)

## 2018-11-29 NOTE — TELEPHONE ENCOUNTER
Update on Rand Woodard not eating and drinking much  Nurse came out to check on her    They would like her to have IV fluids  She does not want to go the the hospital    Please send a script/order to [facility]  Fax [phone]    Also she has active bowel sounds but has not had a bowel movement in about week    Will give a suppository

## 2018-11-30 NOTE — TELEPHONE ENCOUNTER
Called Patsy and informed her the order was faxed to [facility]  I also gave her their phone # [phone] so she can call and make arrangements    Thanks Dr Dez Oliveros

## 2018-11-30 NOTE — TELEPHONE ENCOUNTER
I CANNOT ORDER THIS THROUGH [facility]  IF YOU WANT    WRITE THIS ON ONE A PRESCRIPTION AND STAMP MY SIGNATURE    1 LITER OF LACTATED RINGERS IV OVER 3 HOURS DAILY X 2 DAYS

## 2018-11-30 NOTE — TELEPHONE ENCOUNTER
Maty Bon Homme called this morning stating that her mother is in the process of dying  She is very upset that she was not told that Dr Montserrat Murillo is not in till Monday when she left a message yesterday  She asked if you would write this rx (details are in Zakia's previous message) or she wants Dr Montserrat Murillo called so he can write this rx  Please call Maty Strong when faxed

## 2018-12-05 ENCOUNTER — TELEPHONE (OUTPATIENT)
Dept: FAMILY MEDICINE CLINIC | Facility: CLINIC | Age: 83
End: 2018-12-05

## 2018-12-05 NOTE — TELEPHONE ENCOUNTER
To Dr Anders Net is being Discharged 12/5  Needs an appointment within 2 or 3 days  When are you able to schedule    You are booked til the week of [place]

## 2018-12-05 NOTE — TELEPHONE ENCOUNTER
Carol Conner going to [facility] - She will not be home  Do you want to see her there or when she is home?

## 2018-12-05 NOTE — TELEPHONE ENCOUNTER
I CANT SEE HER AT [facility]  THEY HAVE A COVERING PHYSICIAN  WHEN SHE IS DISCHARGED I CAN

## 2018-12-05 NOTE — TELEPHONE ENCOUNTER
Talked Edna Graham - She understands - she will call us when her mom is home  She said if you want to stop by and say hi - you can do that

## 2018-12-05 NOTE — TELEPHONE ENCOUNTER
To the Clinical Staff    Eduar Kayden is being discharged today 12/5 from [facility]  She does not have an appointment yet  I am waiting on Dr Wilman Garcia to give me a date   (He is booked)

## 2018-12-28 ENCOUNTER — TELEPHONE (OUTPATIENT)
Dept: FAMILY MEDICINE CLINIC | Facility: CLINIC | Age: 83
End: 2018-12-28

## 2018-12-28 NOTE — TELEPHONE ENCOUNTER
Being discharged on 1/2/19 from [facility]    Scheduled for BRIJESH on 1/17/19

## 2019-01-02 ENCOUNTER — TRANSITIONAL CARE MANAGEMENT (OUTPATIENT)
Dept: FAMILY MEDICINE CLINIC | Facility: CLINIC | Age: 84
End: 2019-01-02

## 2019-01-02 NOTE — TELEPHONE ENCOUNTER
Spoke with daughter Mitchell Heard, patient is not being released until Friday 01/04/2019 after 5 pm  Daughter asked if we could call on Monday 01/07/2019 to check up on her mother (TCM)  Stephen Heard is asking if Dr Ronnell Stern could do a home visit in a week after 01/04/2019   KEVON Olivera/DIPAK

## 2019-01-07 ENCOUNTER — TRANSITIONAL CARE MANAGEMENT (OUTPATIENT)
Dept: FAMILY MEDICINE CLINIC | Facility: CLINIC | Age: 84
End: 2019-01-07

## 2019-01-07 ENCOUNTER — TELEPHONE (OUTPATIENT)
Dept: FAMILY MEDICINE CLINIC | Facility: CLINIC | Age: 84
End: 2019-01-07

## 2019-01-07 RX ORDER — SENNA PLUS 8.6 MG/1
1 TABLET ORAL DAILY
COMMUNITY

## 2019-01-07 RX ORDER — METOPROLOL TARTRATE 50 MG/1
25 TABLET, FILM COATED ORAL EVERY 12 HOURS SCHEDULED
COMMUNITY
End: 2019-01-29 | Stop reason: SDUPTHER

## 2019-01-07 RX ORDER — MAG HYDROX/ALUMINUM HYD/SIMETH 400-400-40
1 SUSPENSION, ORAL (FINAL DOSE FORM) ORAL AS NEEDED
COMMUNITY

## 2019-01-07 RX ORDER — MELATONIN
5000 DAILY
COMMUNITY
End: 2019-01-29 | Stop reason: SDUPTHER

## 2019-01-07 NOTE — TELEPHONE ENCOUNTER
Brittany Byrd would like to know what day next week you can come do a house call on her mom    She sates she just got home from [place] and doing well     (reminder- notify Dilshad Fang of the date for the [address] [place] [address] [id number])

## 2019-01-07 NOTE — TELEPHONE ENCOUNTER
HOW ABOUT WED AFTERNOON  NOT SURE OF THE TIME  BETWEEN 12-5  I HAVE TO GO UP TO [place] RIGHT AFTER HOURS    SEE IF THAT WORKS FOR THEM?

## 2019-01-07 NOTE — TELEPHONE ENCOUNTER
Dr Bob Ahmadi said that is great        Lizzeth Cardenas will [address] 01/17/2019 around 5ish

## 2019-01-09 ENCOUNTER — TELEPHONE (OUTPATIENT)
Dept: FAMILY MEDICINE CLINIC | Facility: CLINIC | Age: 84
End: 2019-01-09

## 2019-01-09 DIAGNOSIS — K59.00 CONSTIPATION, UNSPECIFIED CONSTIPATION TYPE: Primary | ICD-10-CM

## 2019-01-09 RX ORDER — MIRTAZAPINE 15 MG/1
TABLET, FILM COATED ORAL
Refills: 0 | COMMUNITY
Start: 2019-01-04

## 2019-01-09 RX ORDER — LACTULOSE 10 G/15ML
20 SOLUTION ORAL 2 TIMES DAILY
Qty: 473 ML | Refills: 3 | Status: SHIPPED | OUTPATIENT
Start: 2019-01-09

## 2019-01-09 NOTE — TELEPHONE ENCOUNTER
When she was in [facility] they were giving her a laxative called Lactulose and that was working  She is home now and has not gone in 5 days  They have tried all over the counters and nothing is working  Can you prescribe her the Lactulose      Send to [employer]    No New allergies

## 2019-01-10 NOTE — TELEPHONE ENCOUNTER
Wanted to know if she can only give her mom 1/2 of the laxative you prescribed    Daughter spoke to someone at the [facility] and they told her that the laxative can be very harsh

## 2019-01-17 ENCOUNTER — OFFICE VISIT (OUTPATIENT)
Dept: FAMILY MEDICINE CLINIC | Facility: CLINIC | Age: 84
End: 2019-01-17
Payer: MEDICARE

## 2019-01-17 DIAGNOSIS — R62.7 FAILURE TO THRIVE IN ADULT: ICD-10-CM

## 2019-01-17 DIAGNOSIS — K21.9 GASTROESOPHAGEAL REFLUX DISEASE WITHOUT ESOPHAGITIS: ICD-10-CM

## 2019-01-17 DIAGNOSIS — I25.10 ATHEROSCLEROSIS OF CORONARY ARTERY OF NATIVE HEART WITHOUT ANGINA PECTORIS, UNSPECIFIED VESSEL OR LESION TYPE: ICD-10-CM

## 2019-01-17 DIAGNOSIS — R32 URINARY INCONTINENCE, UNSPECIFIED TYPE: ICD-10-CM

## 2019-01-17 DIAGNOSIS — Z74.01 BED CONFINEMENT STATUS: ICD-10-CM

## 2019-01-17 DIAGNOSIS — Z09 HOSPITAL DISCHARGE FOLLOW-UP: Primary | ICD-10-CM

## 2019-01-17 DIAGNOSIS — M15.9 PRIMARY OSTEOARTHRITIS INVOLVING MULTIPLE JOINTS: ICD-10-CM

## 2019-01-17 DIAGNOSIS — I24.9 ACUTE CORONARY SYNDROME (HCC): ICD-10-CM

## 2019-01-17 DIAGNOSIS — I10 ESSENTIAL HYPERTENSION: ICD-10-CM

## 2019-01-17 DIAGNOSIS — N39.0 RECURRENT UTI (URINARY TRACT INFECTION): ICD-10-CM

## 2019-01-17 PROCEDURE — 99495 TRANSJ CARE MGMT MOD F2F 14D: CPT | Performed by: FAMILY MEDICINE

## 2019-01-18 NOTE — PROGRESS NOTES
Assessment/Plan:    Problem List Items Addressed This Visit        Digestive    Gastroesophageal reflux       Cardiovascular and Mediastinum    Acute coronary syndrome (HCC)    Atherosclerosis of coronary artery of native heart without angina pectoris    HTN (hypertension)       Musculoskeletal and Integument    Primary osteoarthritis involving multiple joints       Genitourinary    Recurrent UTI (urinary tract infection)       Other    Urinary incontinence    Bed confinement status    Failure to thrive in adult      Other Visit Diagnoses     Hospital discharge follow-up    -  Primary          Patient Instructions   CONTINUE CURRENT TREATMENT  ENCOURAGE PO INTAKE  HYDRATION  RV 1 WEEK, CALL SOONER PRN      Return in about 1 week (around 1/24/2019) for Recheck  Subjective:      Patient ID: Ann Ramos is a 80 y o  female  No chief complaint on file  PATIENT IS S/P [facility]MISS[facility] AND SUBSEQUENT [facility] STAY AT [facility] 19 COURSE AND MEDICATIONS    PATIENT IS CONFINED TO HER BED  DENIES ANY PAIN  CONTINUES TO HAVE POOR PO INTAKE  HOME PT AND NURSING HELPING    DISCUSSED THERAPEUTIC OPTIONS        The following portions of the patient's history were reviewed and updated as appropriate: allergies, current medications, past family history, past medical history, past social history, past surgical history and problem list     Review of Systems   Constitutional: Positive for activity change, appetite change and fatigue  Negative for chills and fever  HENT: Negative for congestion, ear discharge, ear pain, mouth sores, postnasal drip, sore throat and trouble swallowing  Eyes: Negative for pain, discharge and visual disturbance  Respiratory: Negative for cough, shortness of breath and wheezing  Cardiovascular: Negative for chest pain, palpitations and leg swelling  Gastrointestinal: Negative for abdominal distention, abdominal pain, blood in stool, diarrhea and nausea  Endocrine: Negative for polydipsia, polyphagia and polyuria  Genitourinary: Negative for dysuria, frequency, hematuria and urgency  Musculoskeletal: Positive for arthralgias  Negative for gait problem and joint swelling  Skin: Negative for pallor and rash  Neurological: Positive for weakness  Negative for dizziness, syncope, speech difficulty, light-headedness, numbness and headaches  Hematological: Negative for adenopathy  Psychiatric/Behavioral: Negative for behavioral problems, confusion and sleep disturbance  The patient is not nervous/anxious            Current Outpatient Prescriptions   Medication Sig Dispense Refill    amLODIPine (NORVASC) 5 mg tablet Take 1 tablet by mouth daily      apixaban (ELIQUIS) 2 5 mg Take 1 tablet by mouth 2 (two) times a day      cholecalciferol (VITAMIN D3) 1,000 units tablet Take 5,000 Units by mouth daily      CRANBERRY CONCENTRATE PO Take 4,200 mg by mouth      glycerin adult 2 g suppository Insert 1 suppository into the rectum as needed for constipation      lactulose (CHRONULAC) 10 g/15 mL solution Take 30 mL (20 g total) by mouth 2 (two) times a day 473 mL 3    levothyroxine 25 mcg tablet   2    metoprolol tartrate (LOPRESSOR) 50 mg tablet Take 25 mg by mouth every 12 (twelve) hours      mirtazapine (REMERON) 15 mg tablet   0    Nutritional Supplements (BOOST 100 CALORIE SMART PO) Take by mouth      nystatin (MYCOSTATIN) 100,000 units/mL suspension Apply 5 mL (500,000 Units total) to the mouth or throat 4 (four) times a day (Patient not taking: Reported on 1/7/2019 ) 60 mL 0    omeprazole (PriLOSEC) 20 mg delayed release capsule   2    oxybutynin (DITROPAN) 5 mg tablet Take by mouth 2 (two) times a day        oxyCODONE (ROXICODONE) 5 mg/5 mL solution Take 2 5 mL (2 5 mg total) by mouth every 4 (four) hours as needed for moderate pain Max Daily Amount: 15 mg (Patient not taking: Reported on 1/7/2019 ) 15 mL 0    RESTASIS MULTIDOSE 0 05 % ophthalmic emulsion   1    senna (SENOKOT) 8 6 MG tablet Take 1 tablet by mouth daily       No current facility-administered medications for this visit  Objective: There were no vitals taken for this visit  Physical Exam   Constitutional: She is oriented to person, place, and time  THIN  BED BOUND  WEAK   HENT:   Head: Normocephalic and atraumatic  Eyes: Pupils are equal, round, and reactive to light  Conjunctivae and EOM are normal  Right eye exhibits no discharge  Left eye exhibits no discharge  Neck: Normal range of motion  Neck supple  No thyromegaly present  Cardiovascular: Normal rate and regular rhythm  Murmur heard  Pulmonary/Chest: Effort normal and breath sounds normal  No respiratory distress  She has no wheezes  She has no rales  Abdominal: Soft  Bowel sounds are normal  There is no tenderness  Musculoskeletal: Normal range of motion  She exhibits no edema or tenderness  Lymphadenopathy:     She has no cervical adenopathy  Neurological: She is alert and oriented to person, place, and time  Skin: Skin is warm and dry  No rash noted  No erythema  Psychiatric: She has a normal mood and affect  TCM Call (since 12/18/2018)     Date and time call was made  1/7/2019  2:45 PM    Hospital care reviewed  Records reviewed    Patient was hospitialized at  Other (comment)    Comment  [facility] and then [facility]    Date of Admission  11/30/18    Date of discharge  01/04/19    Diagnosis  bladder infection, hallucinations, sever sonstipation and dehydration    Disposition  Home    Were the patients medications reviewed and updated  Yes    Current Symptoms  None      TCM Call (since 12/18/2018)     Post hospital issues  Reduced activity    Should patient be enrolled in anticoag monitoring? No    Scheduled for follow up?   Yes    Patients specialists  Other (comment)    Other specialists names  Primary care    Did you obtain your prescribed medications  Yes    Do you need help managing your prescriptions or medications  No    Is transportation to your appointment needed  No    I have advised the patient to call PCP with any new or worsening symptoms  [id number] [facility] staff; Family;  Children    The type of support provided  Emotional; Physical    Do you have social support  Yes, as much as I need    Are you recieving any outpatient services  Yes    What type of services  PT and OT    Are you recieving home care services  Yes    Types of home care services  Home PT; 24 hr caregiver; Nurse visit    Are you using any community resources  No    Current waiver services  No    Have you fallen in the last 12 months  No    Interperter language line needed  No    Counseling  Family    Counseling topics  Importance of RX compliance               Stefany Calderon MD

## 2019-01-21 ENCOUNTER — TELEPHONE (OUTPATIENT)
Dept: FAMILY MEDICINE CLINIC | Facility: CLINIC | Age: 84
End: 2019-01-21

## 2019-01-21 NOTE — TELEPHONE ENCOUNTER
Rand Jarrell is taking her lactulose as needed instead of 2x a day  Also she doesn't need a nurse anymore so Paul Yuen is not going to see her anymore

## 2019-01-29 DIAGNOSIS — I10 ESSENTIAL HYPERTENSION: ICD-10-CM

## 2019-01-29 DIAGNOSIS — E55.9 VITAMIN D DEFICIENCY: Primary | ICD-10-CM

## 2019-01-29 RX ORDER — MELATONIN
5000 DAILY
Qty: 30 TABLET | Refills: 1 | Status: SHIPPED | OUTPATIENT
Start: 2019-01-29

## 2019-01-29 RX ORDER — METOPROLOL TARTRATE 50 MG/1
25 TABLET, FILM COATED ORAL EVERY 12 HOURS SCHEDULED
Qty: 60 TABLET | Refills: 0 | Status: SHIPPED | OUTPATIENT
Start: 2019-01-29

## 2019-01-31 ENCOUNTER — TELEPHONE (OUTPATIENT)
Dept: FAMILY MEDICINE CLINIC | Facility: CLINIC | Age: 84
End: 2019-01-31

## 2019-01-31 NOTE — TELEPHONE ENCOUNTER
Received a call from Muna (Rand's daughter)  Rand's blood pressure is at 117/47  Stated that that was the most accurate that she could get due to her arm being so tiny and she does not have a child size cuff  Muna stated that David Chester has not been drinking and feels that she may be dehydrated  Would like for you to come out to see David Chester    Also mentioned about having the [address] ([phone]) to come to the home to give fluids  She is concerned that that if she gets too much fluid it will build up water around her heart  That is what happened to her in the hospital   She is also concerned that David Chester may have another UTI  Can they put an antibiotic in the fluid bag  David Chester is total coherent  The company needs a script stating that she needs fluids, and how much, diagnosis code and office notes  Spoke to Dr Tracy Lemons about a home visit  He cannot come today  Zakia is to check with him in the morning and he will look at his schedule      [facility] closes at 5:00 and opens at 8:00

## 2019-02-01 ENCOUNTER — TELEPHONE (OUTPATIENT)
Dept: OTHER | Facility: OTHER | Age: 84
End: 2019-02-01

## 2019-02-01 ENCOUNTER — OFFICE VISIT (OUTPATIENT)
Dept: FAMILY MEDICINE CLINIC | Facility: CLINIC | Age: 84
End: 2019-02-01
Payer: MEDICARE

## 2019-02-01 ENCOUNTER — TELEPHONE (OUTPATIENT)
Dept: FAMILY MEDICINE CLINIC | Facility: CLINIC | Age: 84
End: 2019-02-01

## 2019-02-01 DIAGNOSIS — Z74.01 BED CONFINEMENT STATUS: ICD-10-CM

## 2019-02-01 DIAGNOSIS — N39.0 RECURRENT UTI (URINARY TRACT INFECTION): ICD-10-CM

## 2019-02-01 DIAGNOSIS — N39.0 URINARY TRACT INFECTION WITHOUT HEMATURIA, SITE UNSPECIFIED: Primary | ICD-10-CM

## 2019-02-01 DIAGNOSIS — M15.9 PRIMARY OSTEOARTHRITIS INVOLVING MULTIPLE JOINTS: ICD-10-CM

## 2019-02-01 DIAGNOSIS — R62.7 FAILURE TO THRIVE IN ADULT: Primary | ICD-10-CM

## 2019-02-01 PROCEDURE — 99348 HOME/RES VST EST LOW MDM 30: CPT | Performed by: FAMILY MEDICINE

## 2019-02-01 RX ORDER — NITROFURANTOIN 25 MG/5ML
50 SUSPENSION ORAL 4 TIMES DAILY
Qty: 200 ML | Refills: 0 | Status: SHIPPED | OUTPATIENT
Start: 2019-02-01

## 2019-02-01 NOTE — TELEPHONE ENCOUNTER
Lori Barthel called    She went to [facility] and no antibiotic was called in  Please call into [facility]

## 2019-02-02 ENCOUNTER — TELEPHONE (OUTPATIENT)
Dept: OTHER | Facility: OTHER | Age: 84
End: 2019-02-02

## 2019-02-02 DIAGNOSIS — N30.00 ACUTE CYSTITIS WITHOUT HEMATURIA: Primary | ICD-10-CM

## 2019-02-02 DIAGNOSIS — N39.0 URINARY TRACT INFECTION WITHOUT HEMATURIA, SITE UNSPECIFIED: ICD-10-CM

## 2019-02-02 RX ORDER — NITROFURANTOIN MACROCRYSTALS 50 MG/1
50 CAPSULE ORAL
Qty: 40 CAPSULE | Refills: 0 | Status: SHIPPED | OUTPATIENT
Start: 2019-02-02

## 2019-02-02 NOTE — PROGRESS NOTES
Assessment/Plan:    Problem List Items Addressed This Visit        Musculoskeletal and Integument    Osteoarthritis       Genitourinary    Recurrent UTI (urinary tract infection)       Other    Bed confinement status    Failure to thrive in adult - Primary          BMI Counseling: There is no height or weight on file to calculate BMI  Discussed the patient's BMI with her  The BMI is in the acceptable range  Patient Instructions   DISCUSSED FINDINGS  VERY DEHYDRATED  FAMILY WILL DISCUSS NEXT STEP ? RETURN TO [facility]      RELATED THAT IF NO INTERVENTION IS DECIDED ON THIS MAY BE A TERMINAL EVENT      No Follow-up on file  Subjective:      Patient ID: Jesus Thomas is a 80 y o  female  No chief complaint on file  CALLED TO PATIENT'S HOUSE  ELDA HAS DECREASED HER PO INTAKE  IS MUCH MORE FATIGUED  REFUSES TO DRINK AND HAS TAKEN MINIMAL FOOD INTAKE  ? URINE SMELLS DIFFERENT    CARETAKERS RELATE NO FEVER  BM OK  NO RASHES          The following portions of the patient's history were reviewed and updated as appropriate: allergies, current medications, past family history, past medical history, past social history, past surgical history and problem list     Review of Systems   Constitutional: Positive for activity change, appetite change and fatigue  Negative for chills and fever  HENT: Positive for trouble swallowing  Negative for congestion, ear discharge, ear pain, mouth sores, postnasal drip and sore throat  Eyes: Negative for pain, discharge and visual disturbance  Respiratory: Negative for cough, shortness of breath and wheezing  Cardiovascular: Negative for chest pain, palpitations and leg swelling  Gastrointestinal: Negative for abdominal distention, abdominal pain, blood in stool, diarrhea, nausea and vomiting  Endocrine: Negative for polydipsia, polyphagia and polyuria  Genitourinary: Positive for decreased urine volume   Negative for dysuria, frequency, hematuria and urgency  Musculoskeletal: Positive for arthralgias  Negative for gait problem and joint swelling  Skin: Negative for pallor and rash  Neurological: Negative for dizziness, syncope, speech difficulty, weakness, light-headedness, numbness and headaches  Hematological: Negative for adenopathy  Psychiatric/Behavioral: Positive for confusion and decreased concentration  Negative for behavioral problems and sleep disturbance  The patient is not nervous/anxious            Current Outpatient Prescriptions   Medication Sig Dispense Refill    amLODIPine (NORVASC) 5 mg tablet Take 1 tablet by mouth daily      apixaban (ELIQUIS) 2 5 mg Take 1 tablet by mouth 2 (two) times a day      cholecalciferol (VITAMIN D3) 1,000 units tablet Take 5 tablets (5,000 Units total) by mouth daily 30 tablet 1    CRANBERRY CONCENTRATE PO Take 4,200 mg by mouth      glycerin adult 2 g suppository Insert 1 suppository into the rectum as needed for constipation      lactulose (CHRONULAC) 10 g/15 mL solution Take 30 mL (20 g total) by mouth 2 (two) times a day 473 mL 3    levothyroxine 25 mcg tablet   2    metoprolol tartrate (LOPRESSOR) 50 mg tablet Take 0 5 tablets (25 mg total) by mouth every 12 (twelve) hours 60 tablet 0    mirtazapine (REMERON) 15 mg tablet   0    nitrofurantoin (FURADANTIN) 25 mg/5 mL suspension Take 10 mL (50 mg total) by mouth 4 (four) times a day 200 mL 0    Nutritional Supplements (BOOST 100 CALORIE SMART PO) Take by mouth      nystatin (MYCOSTATIN) 100,000 units/mL suspension Apply 5 mL (500,000 Units total) to the mouth or throat 4 (four) times a day (Patient not taking: Reported on 1/7/2019 ) 60 mL 0    omeprazole (PriLOSEC) 20 mg delayed release capsule   2    oxybutynin (DITROPAN) 5 mg tablet Take by mouth 2 (two) times a day        oxyCODONE (ROXICODONE) 5 mg/5 mL solution Take 2 5 mL (2 5 mg total) by mouth every 4 (four) hours as needed for moderate pain Max Daily Amount: 15 mg (Patient not taking: Reported on 1/7/2019 ) 15 mL 0    RESTASIS MULTIDOSE 0 05 % ophthalmic emulsion   1    senna (SENOKOT) 8 6 MG tablet Take 1 tablet by mouth daily       No current facility-administered medications for this visit  Objective: There were no vitals taken for this visit  Physical Exam   Constitutional: She is oriented to person, place, and time  HENT:   Head: Normocephalic and atraumatic  MUCOUS MEMBRANES DRY     Eyes: Pupils are equal, round, and reactive to light  Conjunctivae and EOM are normal  Right eye exhibits no discharge  Left eye exhibits no discharge  Neck: Normal range of motion  Neck supple  No thyromegaly present  Cardiovascular: Normal rate, regular rhythm and normal heart sounds  No murmur heard  Pulmonary/Chest: Effort normal and breath sounds normal  No respiratory distress  She has no wheezes  She has no rales  Abdominal: Soft  Bowel sounds are normal  There is no tenderness  Musculoskeletal: Normal range of motion  She exhibits no edema or tenderness  Lymphadenopathy:     She has no cervical adenopathy  Neurological: She is alert and oriented to person, place, and time  Skin: Skin is warm and dry  No rash noted  No erythema     DECREASED TURGOR  NO RASHES   Psychiatric:   Young Cordial CONVERSANT  SL CONFUSED              Cat Monroy MD

## 2019-02-02 NOTE — PATIENT INSTRUCTIONS
DISCUSSED FINDINGS  VERY DEHYDRATED  FAMILY WILL DISCUSS NEXT STEP ?  RETURN TO [facility]      RELATED THAT IF NO INTERVENTION IS DECIDED ON THIS MAY BE A TERMINAL EVENT

## 2019-02-02 NOTE — TELEPHONE ENCOUNTER
The medication is not in stock  The daughter would like a alternate medication  Please give us a call at the pharmacy

## 2019-02-04 RX ORDER — NITROFURANTOIN 25 MG/5ML
50 SUSPENSION ORAL 4 TIMES DAILY
Qty: 200 ML | Refills: 0 | OUTPATIENT
Start: 2019-02-04

## 2019-02-07 ENCOUNTER — TELEPHONE (OUTPATIENT)
Dept: FAMILY MEDICINE CLINIC | Facility: CLINIC | Age: 84
End: 2019-02-07

## 2019-02-07 NOTE — TELEPHONE ENCOUNTER
Riya Castellanos called last week wanting to verify if you lowered Rand's metoprolol when you did a home visit  Originally when the message to ask you was left, you said no, but Enrrique Agudelo wanted to remind you after looking into it  You did change her metropolol to 50mg 1/2 tab every 12 hours and originally it was 1 tab

## 2019-02-08 ENCOUNTER — TELEPHONE (OUTPATIENT)
Dept: FAMILY MEDICINE CLINIC | Facility: CLINIC | Age: 84
End: 2019-02-08

## 2019-02-08 DIAGNOSIS — B37.0 THRUSH: ICD-10-CM

## 2019-02-08 RX ORDER — CLOTRIMAZOLE 10 MG/1
10 LOZENGE ORAL; TOPICAL
Qty: 45 TABLET | Refills: 1 | Status: SHIPPED | OUTPATIENT
Start: 2019-02-08

## 2019-02-08 NOTE — TELEPHONE ENCOUNTER
[facility] called back stating the Nystatin is on backorder  Can you call something else in?  The only recommendation the pharmacist had was Clotrimazole sugarzensalomon Reeder

## 2019-02-08 NOTE — TELEPHONE ENCOUNTER
Her mom's tongue is bright red and has a sore on it  She also wont drink because she said it hurts  Do you think it could be thrush?   Can you call in a mouthwash? [phone]  SR Reeder

## 2019-02-08 NOTE — TELEPHONE ENCOUNTER
Kari Gao is afraid Mirza Rodriguez will choke on the lozenge   She would prefer Fluconazole tablet or suspension    Please call into [facility]

## 2019-02-09 DIAGNOSIS — B37.0 THRUSH: Primary | ICD-10-CM

## 2019-02-09 RX ORDER — FLUCONAZOLE 40 MG/ML
100 POWDER, FOR SUSPENSION ORAL DAILY
Qty: 35 ML | Refills: 0 | Status: SHIPPED | OUTPATIENT
Start: 2019-02-09 | End: 2019-02-14

## 2019-02-18 ENCOUNTER — TELEPHONE (OUTPATIENT)
Dept: FAMILY MEDICINE CLINIC | Facility: CLINIC | Age: 84
End: 2019-02-18

## 2019-02-18 NOTE — TELEPHONE ENCOUNTER
Ander Fraire will no longer be seeing Robert Triplett  She states she has stabilized and does not need to be seen any longer

## 2019-02-26 ENCOUNTER — TELEPHONE (OUTPATIENT)
Dept: FAMILY MEDICINE CLINIC | Facility: CLINIC | Age: 84
End: 2019-02-26

## 2019-02-26 NOTE — TELEPHONE ENCOUNTER
HAND SWELLING SHOULD NOT HAVE ANYTHING TO DO WITH HER HEART JULIA IF IT IS UNILATERAL AND NOT INVOLVING LEGS OR FEET

## 2019-02-26 NOTE — TELEPHONE ENCOUNTER
Wanted to run by you, her left hand is swollen  This is happening more frequently  Wanted to know if this be caused by her heart  Please advise        Was wondering is you could run it by Dr Jai Gallegos her cardiologist   There is no she can get her mom to him

## 2019-02-27 ENCOUNTER — TELEPHONE (OUTPATIENT)
Dept: FAMILY MEDICINE CLINIC | Facility: CLINIC | Age: 84
End: 2019-02-27

## 2019-03-11 ENCOUNTER — TELEPHONE (OUTPATIENT)
Dept: FAMILY MEDICINE CLINIC | Facility: CLINIC | Age: 84
End: 2019-03-11

## 2019-03-11 NOTE — TELEPHONE ENCOUNTER
Family contacted [facility]  They are ready to put Ankit on  They will put on for Advanced Cardiac disease  They want to verify that you will sign the orders

## 2019-04-09 ENCOUNTER — TELEPHONE (OUTPATIENT)
Dept: OTHER | Facility: OTHER | Age: 84
End: 2019-04-09

## 2019-04-11 ENCOUNTER — TELEPHONE (OUTPATIENT)
Dept: FAMILY MEDICINE CLINIC | Facility: CLINIC | Age: 84
End: 2019-04-11

## 2023-09-12 NOTE — TELEPHONE ENCOUNTER
Please call Lynn/[facility]// KEVON Pandya/06 28 23/Needs clarification on medication    Paged Dr Christopher Dalton
Please see below   Unknown if this was completed
Female